# Patient Record
Sex: MALE | Race: WHITE | NOT HISPANIC OR LATINO | Employment: FULL TIME | URBAN - METROPOLITAN AREA
[De-identification: names, ages, dates, MRNs, and addresses within clinical notes are randomized per-mention and may not be internally consistent; named-entity substitution may affect disease eponyms.]

---

## 2021-03-04 ENCOUNTER — OFFICE VISIT (OUTPATIENT)
Dept: FAMILY MEDICINE | Facility: CLINIC | Age: 46
End: 2021-03-04
Payer: COMMERCIAL

## 2021-03-04 VITALS
DIASTOLIC BLOOD PRESSURE: 84 MMHG | BODY MASS INDEX: 29.9 KG/M2 | HEIGHT: 67 IN | TEMPERATURE: 98 F | OXYGEN SATURATION: 96 % | WEIGHT: 190.5 LBS | HEART RATE: 79 BPM | SYSTOLIC BLOOD PRESSURE: 120 MMHG

## 2021-03-04 DIAGNOSIS — G80.8: ICD-10-CM

## 2021-03-04 DIAGNOSIS — I10 ESSENTIAL HYPERTENSION: ICD-10-CM

## 2021-03-04 DIAGNOSIS — F41.1 ANXIETY STATE: ICD-10-CM

## 2021-03-04 DIAGNOSIS — Z76.89 ENCOUNTER TO ESTABLISH CARE: Primary | ICD-10-CM

## 2021-03-04 DIAGNOSIS — L66.1 LICHEN PLANOPILARIS: ICD-10-CM

## 2021-03-04 PROBLEM — L66.10 LICHEN PLANOPILARIS: Status: ACTIVE | Noted: 2021-03-04

## 2021-03-04 PROCEDURE — 3079F PR MOST RECENT DIASTOLIC BLOOD PRESSURE 80-89 MM HG: ICD-10-PCS | Mod: S$GLB,,, | Performed by: NURSE PRACTITIONER

## 2021-03-04 PROCEDURE — 99204 PR OFFICE/OUTPT VISIT, NEW, LEVL IV, 45-59 MIN: ICD-10-PCS | Mod: S$GLB,,, | Performed by: NURSE PRACTITIONER

## 2021-03-04 PROCEDURE — 1126F AMNT PAIN NOTED NONE PRSNT: CPT | Mod: S$GLB,,, | Performed by: NURSE PRACTITIONER

## 2021-03-04 PROCEDURE — 3008F PR BODY MASS INDEX (BMI) DOCUMENTED: ICD-10-PCS | Mod: S$GLB,,, | Performed by: NURSE PRACTITIONER

## 2021-03-04 PROCEDURE — 3008F BODY MASS INDEX DOCD: CPT | Mod: S$GLB,,, | Performed by: NURSE PRACTITIONER

## 2021-03-04 PROCEDURE — 3079F DIAST BP 80-89 MM HG: CPT | Mod: S$GLB,,, | Performed by: NURSE PRACTITIONER

## 2021-03-04 PROCEDURE — 3074F SYST BP LT 130 MM HG: CPT | Mod: S$GLB,,, | Performed by: NURSE PRACTITIONER

## 2021-03-04 PROCEDURE — 1126F PR PAIN SEVERITY QUANTIFIED, NO PAIN PRESENT: ICD-10-PCS | Mod: S$GLB,,, | Performed by: NURSE PRACTITIONER

## 2021-03-04 PROCEDURE — 3074F PR MOST RECENT SYSTOLIC BLOOD PRESSURE < 130 MM HG: ICD-10-PCS | Mod: S$GLB,,, | Performed by: NURSE PRACTITIONER

## 2021-03-04 PROCEDURE — 99204 OFFICE O/P NEW MOD 45 MIN: CPT | Mod: S$GLB,,, | Performed by: NURSE PRACTITIONER

## 2021-03-04 RX ORDER — ALPRAZOLAM 0.5 MG/1
TABLET ORAL
COMMUNITY

## 2021-03-04 RX ORDER — MINOXIDIL 2.5 MG/1
5 TABLET ORAL DAILY
COMMUNITY

## 2021-03-04 RX ORDER — LISINOPRIL 10 MG/1
10 TABLET ORAL DAILY
Qty: 90 TABLET | Refills: 1 | Status: SHIPPED | OUTPATIENT
Start: 2021-03-04 | End: 2021-09-07 | Stop reason: SDUPTHER

## 2021-03-04 RX ORDER — GLYCOPYRROLATE 2 MG/1
2 TABLET ORAL DAILY
COMMUNITY

## 2021-03-27 LAB
ALBUMIN/CREAT UR: NORMAL MCG/MG CREAT
CHOLEST SERPL-MCNC: 134 MG/DL
CHOLEST/HDLC SERPL: 2.2 (CALC)
CREAT UR-MCNC: 52 MG/DL (ref 20–320)
HDLC SERPL-MCNC: 60 MG/DL
LDLC SERPL CALC-MCNC: 62 MG/DL (CALC)
MICROALBUMIN UR-MCNC: <0.2 MG/DL
NONHDLC SERPL-MCNC: 74 MG/DL (CALC)
TRIGL SERPL-MCNC: 50 MG/DL

## 2021-03-30 ENCOUNTER — TELEPHONE (OUTPATIENT)
Dept: FAMILY MEDICINE | Facility: CLINIC | Age: 46
End: 2021-03-30

## 2021-09-07 ENCOUNTER — OFFICE VISIT (OUTPATIENT)
Dept: FAMILY MEDICINE | Facility: CLINIC | Age: 46
End: 2021-09-07
Payer: COMMERCIAL

## 2021-09-07 VITALS
OXYGEN SATURATION: 98 % | RESPIRATION RATE: 18 BRPM | SYSTOLIC BLOOD PRESSURE: 128 MMHG | HEIGHT: 67 IN | HEART RATE: 89 BPM | DIASTOLIC BLOOD PRESSURE: 84 MMHG | WEIGHT: 191 LBS | BODY MASS INDEX: 29.98 KG/M2 | TEMPERATURE: 98 F

## 2021-09-07 DIAGNOSIS — Z13.6 ENCOUNTER FOR LIPID SCREENING FOR CARDIOVASCULAR DISEASE: ICD-10-CM

## 2021-09-07 DIAGNOSIS — Z11.4 SCREENING FOR HIV (HUMAN IMMUNODEFICIENCY VIRUS): ICD-10-CM

## 2021-09-07 DIAGNOSIS — Z13.220 ENCOUNTER FOR LIPID SCREENING FOR CARDIOVASCULAR DISEASE: ICD-10-CM

## 2021-09-07 DIAGNOSIS — Z11.59 NEED FOR HEPATITIS C SCREENING TEST: ICD-10-CM

## 2021-09-07 DIAGNOSIS — I10 ESSENTIAL HYPERTENSION: Primary | ICD-10-CM

## 2021-09-07 DIAGNOSIS — Z00.00 HEALTHCARE MAINTENANCE: ICD-10-CM

## 2021-09-07 PROCEDURE — 99213 OFFICE O/P EST LOW 20 MIN: CPT | Mod: S$GLB,,, | Performed by: NURSE PRACTITIONER

## 2021-09-07 PROCEDURE — 99213 PR OFFICE/OUTPT VISIT, EST, LEVL III, 20-29 MIN: ICD-10-PCS | Mod: S$GLB,,, | Performed by: NURSE PRACTITIONER

## 2021-09-07 RX ORDER — LISINOPRIL 10 MG/1
10 TABLET ORAL DAILY
Qty: 90 TABLET | Refills: 1 | Status: SHIPPED | OUTPATIENT
Start: 2021-09-07 | End: 2022-06-01 | Stop reason: SDUPTHER

## 2022-04-21 ENCOUNTER — OFFICE VISIT (OUTPATIENT)
Dept: FAMILY MEDICINE | Facility: CLINIC | Age: 47
End: 2022-04-21
Payer: COMMERCIAL

## 2022-04-21 VITALS
SYSTOLIC BLOOD PRESSURE: 114 MMHG | WEIGHT: 187.63 LBS | TEMPERATURE: 99 F | OXYGEN SATURATION: 97 % | DIASTOLIC BLOOD PRESSURE: 80 MMHG | HEIGHT: 67 IN | HEART RATE: 80 BPM | BODY MASS INDEX: 29.45 KG/M2

## 2022-04-21 DIAGNOSIS — R06.83 LOUD SNORING: ICD-10-CM

## 2022-04-21 DIAGNOSIS — I10 ESSENTIAL HYPERTENSION: ICD-10-CM

## 2022-04-21 DIAGNOSIS — Z11.4 SCREENING FOR HIV (HUMAN IMMUNODEFICIENCY VIRUS): ICD-10-CM

## 2022-04-21 DIAGNOSIS — Z11.59 NEED FOR HEPATITIS C SCREENING TEST: ICD-10-CM

## 2022-04-21 DIAGNOSIS — F41.1 ANXIETY STATE: ICD-10-CM

## 2022-04-21 DIAGNOSIS — R06.81 WITNESSED EPISODE OF APNEA: ICD-10-CM

## 2022-04-21 DIAGNOSIS — Z00.00 ANNUAL PHYSICAL EXAM: Primary | ICD-10-CM

## 2022-04-21 PROCEDURE — 4010F ACE/ARB THERAPY RXD/TAKEN: CPT | Mod: S$GLB,,, | Performed by: NURSE PRACTITIONER

## 2022-04-21 PROCEDURE — 3079F DIAST BP 80-89 MM HG: CPT | Mod: S$GLB,,, | Performed by: NURSE PRACTITIONER

## 2022-04-21 PROCEDURE — 3008F PR BODY MASS INDEX (BMI) DOCUMENTED: ICD-10-PCS | Mod: S$GLB,,, | Performed by: NURSE PRACTITIONER

## 2022-04-21 PROCEDURE — 4010F PR ACE/ARB THEARPY RXD/TAKEN: ICD-10-PCS | Mod: S$GLB,,, | Performed by: NURSE PRACTITIONER

## 2022-04-21 PROCEDURE — 3008F BODY MASS INDEX DOCD: CPT | Mod: S$GLB,,, | Performed by: NURSE PRACTITIONER

## 2022-04-21 PROCEDURE — 3074F PR MOST RECENT SYSTOLIC BLOOD PRESSURE < 130 MM HG: ICD-10-PCS | Mod: S$GLB,,, | Performed by: NURSE PRACTITIONER

## 2022-04-21 PROCEDURE — 1160F PR REVIEW ALL MEDS BY PRESCRIBER/CLIN PHARMACIST DOCUMENTED: ICD-10-PCS | Mod: S$GLB,,, | Performed by: NURSE PRACTITIONER

## 2022-04-21 PROCEDURE — 1160F RVW MEDS BY RX/DR IN RCRD: CPT | Mod: S$GLB,,, | Performed by: NURSE PRACTITIONER

## 2022-04-21 PROCEDURE — 3074F SYST BP LT 130 MM HG: CPT | Mod: S$GLB,,, | Performed by: NURSE PRACTITIONER

## 2022-04-21 PROCEDURE — 99396 PREV VISIT EST AGE 40-64: CPT | Mod: S$GLB,,, | Performed by: NURSE PRACTITIONER

## 2022-04-21 PROCEDURE — 99396 PR PREVENTIVE VISIT,EST,40-64: ICD-10-PCS | Mod: S$GLB,,, | Performed by: NURSE PRACTITIONER

## 2022-04-21 PROCEDURE — 3079F PR MOST RECENT DIASTOLIC BLOOD PRESSURE 80-89 MM HG: ICD-10-PCS | Mod: S$GLB,,, | Performed by: NURSE PRACTITIONER

## 2022-04-21 RX ORDER — CYCLOSPORINE 100 MG/1
100 CAPSULE, LIQUID FILLED ORAL 2 TIMES DAILY
COMMUNITY
Start: 2022-02-06

## 2022-04-21 RX ORDER — KETOCONAZOLE 20 MG/ML
SHAMPOO, SUSPENSION TOPICAL
COMMUNITY
Start: 2022-01-31

## 2022-04-21 NOTE — PROGRESS NOTES
SUBJECTIVE:      Patient ID: Umair Jackson is a 46 y.o. male.    Chief Complaint: Annual Exam    46-year-old male presents to the clinic for an annual exam.  PMH significant for seizures, monoplegic cerebral palsy, hypertension, lichen planopilaris, and anxiety.  He is a nonsmoker.  Alcohol consumption is rare.  Denies illicit drug use.  Labs were ordered at his las visit, but not completed.  Overdue healthcare maintenance reviewed.  Due for tetanus and COVID-19 booster.  He has never had a colonoscopy.     Blood pressure is controlled with Lisinopril 10 mg daily.      He takes Xanax for anxiety and for his cerebral palsy.  He is followed by the neurologist Dr. Mat Silvestre.      He is on cyclosporin for lichen planopilaris and gets his liver enzymes check routinely by his dermatologist, Dr. Gita Smith. He also takes minoxidil 2.5 mg daily for alopecia and glycopyrrolate 2 mg daily for hyperhidrosis.      Concerned about sleep apnea.  Reports loud snoring.  Wife has witnessed episodes of apnea.  Denies excessive daytime fatigue or waking up gasping for air.     Hypertension  This is a chronic problem. The current episode started more than 1 year ago. The problem is controlled. Associated symptoms include anxiety. Pertinent negatives include no blurred vision, chest pain, headaches, malaise/fatigue, neck pain, orthopnea, palpitations, peripheral edema, PND, shortness of breath or sweats. There are no associated agents to hypertension. Risk factors for coronary artery disease include male gender. Past treatments include ACE inhibitors. The current treatment provides significant improvement. Compliance problems include exercise and diet.        Family History   Problem Relation Age of Onset    Hypertension Father       Social History     Socioeconomic History    Marital status:    Tobacco Use    Smoking status: Never Smoker    Smokeless tobacco: Never Used   Substance and Sexual Activity    Alcohol  use: Yes     Comment: very little    Drug use: Never    Sexual activity: Yes     Current Outpatient Medications   Medication Sig Dispense Refill    ALPRAZolam (XANAX) 0.5 MG tablet Xanax 0.5 mg tablet   Take 1 tablet 3 times a day by oral route.      cycloSPORINE modified (GENGRAF) 50 MG capsule Take 50 mg by mouth 3 (three) times daily. Patient take 3 capsules in the AM      cycloSPORINE modified, NEORAL, (NEORAL) 100 MG capsule Take 100 mg by mouth 2 (two) times daily.      glycopyrrolate (ROBINUL) 2 MG Tab Take 2 mg by mouth once daily.      lisinopriL 10 MG tablet Take 1 tablet (10 mg total) by mouth once daily. 90 tablet 1    minoxidiL (LONITEN) 2.5 MG tablet Take 5 mg by mouth once daily. 2  2.5 MG DAILY      ALPRAZolam (XANAX) 0.5 MG tablet Every day      ketoconazole (NIZORAL) 2 % shampoo SMARTSIG:Topical 2-3 Times Weekly       No current facility-administered medications for this visit.     Review of patient's allergies indicates:   Allergen Reactions    No known drug allergies       Past Medical History:   Diagnosis Date    Cerebral palsy, hemiplegic     Seizures 2002     History reviewed. No pertinent surgical history.    Review of Systems   Constitutional: Negative for activity change, appetite change, chills, diaphoresis, fatigue, fever, malaise/fatigue and unexpected weight change.   HENT: Negative for congestion, ear pain, sinus pressure, sore throat, trouble swallowing and voice change.         Lichen planopilaris of scalp    Eyes: Negative for blurred vision, pain, discharge and visual disturbance.   Respiratory: Positive for apnea. Negative for cough, chest tightness, shortness of breath and wheezing.    Cardiovascular: Negative for chest pain, palpitations, orthopnea and PND.        Hypertension    Gastrointestinal: Negative for abdominal pain, constipation, diarrhea, nausea and vomiting.   Genitourinary: Negative for difficulty urinating, flank pain, frequency and urgency.  "  Musculoskeletal: Negative for back pain, joint swelling and neck pain.   Skin: Negative for color change and rash.   Neurological: Negative for dizziness, seizures, syncope, weakness, numbness and headaches.        Monoplegic cerebral palsy, history of seizures, last seizure in 2004.   Hematological: Negative for adenopathy.   Psychiatric/Behavioral: Negative for dysphoric mood and sleep disturbance. The patient is not nervous/anxious.       OBJECTIVE:      Vitals:    04/21/22 0836   BP: 114/80   BP Location: Left arm   Patient Position: Sitting   BP Method: Medium (Manual)   Pulse: 80   Temp: 98.5 °F (36.9 °C)   TempSrc: Oral   SpO2: 97%   Weight: 85.1 kg (187 lb 9.6 oz)   Height: 5' 7" (1.702 m)     Physical Exam  Vitals and nursing note reviewed.   Constitutional:       General: He is awake. He is not in acute distress.     Appearance: Normal appearance. He is overweight. He is not ill-appearing, toxic-appearing or diaphoretic.   HENT:      Head: Normocephalic and atraumatic.      Right Ear: Tympanic membrane, ear canal and external ear normal. There is no impacted cerumen.      Left Ear: Tympanic membrane, ear canal and external ear normal. There is no impacted cerumen.      Nose: Nose normal.   Eyes:      General: Lids are normal. Gaze aligned appropriately.      Conjunctiva/sclera: Conjunctivae normal.      Right eye: Right conjunctiva is not injected.      Left eye: Left conjunctiva is not injected.      Pupils: Pupils are equal, round, and reactive to light.   Neck:      Thyroid: No thyromegaly.      Vascular: No carotid bruit.   Cardiovascular:      Rate and Rhythm: Normal rate and regular rhythm.      Pulses: Normal pulses.      Heart sounds: Normal heart sounds, S1 normal and S2 normal. No murmur heard.    No friction rub. No gallop.   Pulmonary:      Effort: Pulmonary effort is normal. No respiratory distress.      Breath sounds: Normal breath sounds. No stridor. No decreased breath sounds, wheezing, " rhonchi or rales.   Chest:      Chest wall: No tenderness.   Abdominal:      General: Bowel sounds are normal.      Palpations: Abdomen is soft.      Tenderness: There is no abdominal tenderness.   Musculoskeletal:      Right hand: Deformity present.      Cervical back: Neck supple.      Right lower leg: No edema.      Left lower leg: No edema.      Comments: Right hand contracture and deformity due to CP.   Lymphadenopathy:      Cervical: No cervical adenopathy.   Skin:     General: Skin is warm and dry.      Capillary Refill: Capillary refill takes less than 2 seconds.      Findings: No erythema or rash.      Comments: Scalp alopecia   Neurological:      Mental Status: He is alert and oriented to person, place, and time. Mental status is at baseline.      Cranial Nerves: Cranial nerves are intact.      Comments: Right sided weakness    Psychiatric:         Attention and Perception: Attention normal.         Mood and Affect: Mood normal.         Speech: Speech normal.         Behavior: Behavior normal. Behavior is cooperative.         Thought Content: Thought content normal.         Judgment: Judgment normal.        Assessment:       1. Annual physical exam    2. Essential hypertension    3. Witnessed episode of apnea    4. Loud snoring    5. Anxiety state    6. Screening for HIV (human immunodeficiency virus)    7. Need for hepatitis C screening test        Plan:       Annual physical exam  Counseled on age and gender appropriate medical preventative services, including cancer screenings, immunizations, overall nutritional health, need for a consistent exercise regimen and an overall push towards maintaining a vigorous and active lifestyle.  Discussed colon cancer screening and various options.  Patient will think about it and will discuss again in 6 months at follow-up. COVID-19 booster recommended.   -     Comprehensive Metabolic Panel; Future; Expected date: 04/21/2022  -     Lipid Panel; Future; Expected date:  04/21/2022  -     Hepatitis C Antibody; Future; Expected date: 04/21/2022  -     TSH; Future; Expected date: 04/21/2022  -     Microalbumin/Creatinine Ratio, Urine; Future; Expected date: 04/21/2022  -     Urinalysis; Future; Expected date: 04/21/2022  -     HIV 1/2 Ag/Ab (4th Gen); Future; Expected date: 04/21/2022    Essential hypertension  Stable, continue Lisinopril.  Low sodium diet, exercise, and weight loss encouraged.   -     Comprehensive Metabolic Panel; Future; Expected date: 04/21/2022  -     Lipid Panel; Future; Expected date: 04/21/2022  -     TSH; Future; Expected date: 04/21/2022  -     Microalbumin/Creatinine Ratio, Urine; Future; Expected date: 04/21/2022  -     Urinalysis; Future; Expected date: 04/21/2022    Witnessed episode of apnea  Sleep study ordered and faxed to [facility].   -     Home Sleep Studies; Future    Loud snoring  -     Home Sleep Studies; Future    Anxiety state  Stable with Xanax, managed by Neurology.   -     TSH; Future; Expected date: 04/21/2022    Screening for HIV (human immunodeficiency virus)  -     HIV 1/2 Ag/Ab (4th Gen); Future; Expected date: 04/21/2022    Need for hepatitis C screening test  -     Hepatitis C Antibody; Future; Expected date: 04/21/2022    This note was created using [employer] voice recognition software that occasionally misinterprets phrases or words.     Follow up in about 6 months (around 10/21/2022) for HTN.      4/21/2022 GOLDEN Callejas, FNP

## 2022-06-01 DIAGNOSIS — I10 ESSENTIAL HYPERTENSION: ICD-10-CM

## 2022-06-01 RX ORDER — LISINOPRIL 10 MG/1
10 TABLET ORAL DAILY
Qty: 30 TABLET | Refills: 0 | Status: SHIPPED | OUTPATIENT
Start: 2022-06-01 | End: 2022-07-06 | Stop reason: SDUPTHER

## 2022-07-06 DIAGNOSIS — I10 ESSENTIAL HYPERTENSION: ICD-10-CM

## 2022-07-06 RX ORDER — LISINOPRIL 10 MG/1
10 TABLET ORAL DAILY
Qty: 30 TABLET | Refills: 2 | Status: SHIPPED | OUTPATIENT
Start: 2022-07-06 | End: 2022-10-10

## 2022-07-06 RX ORDER — LISINOPRIL 10 MG/1
TABLET ORAL
Qty: 30 TABLET | Refills: 0 | OUTPATIENT
Start: 2022-07-06

## 2022-07-06 NOTE — TELEPHONE ENCOUNTER
----- Message from Anabel Segovia sent at 7/6/2022 12:55 PM CDT -----  Patient requests Lisinopril refill sent to [facility] .

## 2022-10-27 ENCOUNTER — OFFICE VISIT (OUTPATIENT)
Dept: FAMILY MEDICINE | Facility: CLINIC | Age: 47
End: 2022-10-27
Payer: COMMERCIAL

## 2022-10-27 VITALS
DIASTOLIC BLOOD PRESSURE: 82 MMHG | OXYGEN SATURATION: 96 % | SYSTOLIC BLOOD PRESSURE: 120 MMHG | TEMPERATURE: 98 F | HEART RATE: 91 BPM | WEIGHT: 193.31 LBS | HEIGHT: 67 IN | BODY MASS INDEX: 30.34 KG/M2

## 2022-10-27 DIAGNOSIS — F41.1 ANXIETY STATE: ICD-10-CM

## 2022-10-27 DIAGNOSIS — Z11.4 SCREENING FOR HIV (HUMAN IMMUNODEFICIENCY VIRUS): ICD-10-CM

## 2022-10-27 DIAGNOSIS — L66.1 LICHEN PLANOPILARIS: ICD-10-CM

## 2022-10-27 DIAGNOSIS — I10 ESSENTIAL HYPERTENSION: Primary | ICD-10-CM

## 2022-10-27 DIAGNOSIS — G40.309 GENERALIZED CONVULSIVE EPILEPSY: ICD-10-CM

## 2022-10-27 DIAGNOSIS — Z11.59 NEED FOR HEPATITIS C SCREENING TEST: ICD-10-CM

## 2022-10-27 DIAGNOSIS — G80.1 SPASTIC CEREBRAL PALSY: ICD-10-CM

## 2022-10-27 PROCEDURE — 99214 PR OFFICE/OUTPT VISIT, EST, LEVL IV, 30-39 MIN: ICD-10-PCS | Mod: S$GLB,,, | Performed by: NURSE PRACTITIONER

## 2022-10-27 PROCEDURE — 1160F RVW MEDS BY RX/DR IN RCRD: CPT | Mod: CPTII,S$GLB,, | Performed by: NURSE PRACTITIONER

## 2022-10-27 PROCEDURE — 99214 OFFICE O/P EST MOD 30 MIN: CPT | Mod: S$GLB,,, | Performed by: NURSE PRACTITIONER

## 2022-10-27 PROCEDURE — 3079F DIAST BP 80-89 MM HG: CPT | Mod: CPTII,S$GLB,, | Performed by: NURSE PRACTITIONER

## 2022-10-27 PROCEDURE — 3079F PR MOST RECENT DIASTOLIC BLOOD PRESSURE 80-89 MM HG: ICD-10-PCS | Mod: CPTII,S$GLB,, | Performed by: NURSE PRACTITIONER

## 2022-10-27 PROCEDURE — 1159F MED LIST DOCD IN RCRD: CPT | Mod: CPTII,S$GLB,, | Performed by: NURSE PRACTITIONER

## 2022-10-27 PROCEDURE — 1160F PR REVIEW ALL MEDS BY PRESCRIBER/CLIN PHARMACIST DOCUMENTED: ICD-10-PCS | Mod: CPTII,S$GLB,, | Performed by: NURSE PRACTITIONER

## 2022-10-27 PROCEDURE — 4010F ACE/ARB THERAPY RXD/TAKEN: CPT | Mod: CPTII,S$GLB,, | Performed by: NURSE PRACTITIONER

## 2022-10-27 PROCEDURE — 3074F SYST BP LT 130 MM HG: CPT | Mod: CPTII,S$GLB,, | Performed by: NURSE PRACTITIONER

## 2022-10-27 PROCEDURE — 3074F PR MOST RECENT SYSTOLIC BLOOD PRESSURE < 130 MM HG: ICD-10-PCS | Mod: CPTII,S$GLB,, | Performed by: NURSE PRACTITIONER

## 2022-10-27 PROCEDURE — 1159F PR MEDICATION LIST DOCUMENTED IN MEDICAL RECORD: ICD-10-PCS | Mod: CPTII,S$GLB,, | Performed by: NURSE PRACTITIONER

## 2022-10-27 PROCEDURE — 4010F PR ACE/ARB THEARPY RXD/TAKEN: ICD-10-PCS | Mod: CPTII,S$GLB,, | Performed by: NURSE PRACTITIONER

## 2022-10-27 RX ORDER — LISINOPRIL 10 MG/1
10 TABLET ORAL DAILY
Qty: 90 TABLET | Refills: 1 | Status: SHIPPED | OUTPATIENT
Start: 2022-10-27 | End: 2023-04-27 | Stop reason: SDUPTHER

## 2022-10-27 NOTE — PROGRESS NOTES
SUBJECTIVE:      Patient ID: Umair Jackson is a 47 y.o. male.    Chief Complaint: Follow-up, Hypertension, and Medication Refill (Lisinopril )    47-year-old male presents to clinic for hypertension follow-up.      Labs were ordered, but not completed.  He takes lisinopril 10 mg once daily.  Blood pressure has been controlled.  Patient is doing well offers no new complaints.  He needs a medication refill.    Patient had his colonoscopy on 10/13, no polyps or biopsies, cleared for 10 years.    He takes Xanax for anxiety and for his cerebral palsy, which is managed by Dr. Mat Silvestre his neurologist.      He is on cyclosporin for lichen planopilaris and gets his liver enzymes check routinely by his dermatologist, Dr. Gita Smith.     Hypertension  This is a chronic problem. The current episode started more than 1 year ago. The problem is controlled. Associated symptoms include anxiety. Pertinent negatives include no blurred vision, chest pain, headaches, malaise/fatigue, neck pain, orthopnea, palpitations, peripheral edema, PND, shortness of breath or sweats. There are no associated agents to hypertension. Risk factors for coronary artery disease include male gender. Past treatments include ACE inhibitors. The current treatment provides significant improvement. Compliance problems include exercise and diet.  There is no history of angina. There is no history of chronic renal disease or sleep apnea.     Family History   Problem Relation Age of Onset    Hypertension Father       Social History     Socioeconomic History    Marital status:    Tobacco Use    Smoking status: Never    Smokeless tobacco: Never   Substance and Sexual Activity    Alcohol use: Yes     Comment: very little    Drug use: Never    Sexual activity: Yes     Current Outpatient Medications   Medication Sig Dispense Refill    ALPRAZolam (XANAX) 0.5 MG tablet Xanax 0.5 mg tablet   Take 1 tablet 3 times a day by oral route.      cycloSPORINE  modified, NEORAL, (NEORAL) 100 MG capsule Take 100 mg by mouth 2 (two) times daily.      glycopyrrolate (ROBINUL) 2 MG Tab Take 2 mg by mouth once daily.      ketoconazole (NIZORAL) 2 % shampoo SMARTSIG:Topical 2-3 Times Weekly      minoxidiL (LONITEN) 2.5 MG tablet Take 5 mg by mouth once daily. 2  2.5 MG DAILY      lisinopriL 10 MG tablet Take 1 tablet (10 mg total) by mouth once daily. 90 tablet 1     No current facility-administered medications for this visit.     Review of patient's allergies indicates:   Allergen Reactions    No known drug allergies       Past Medical History:   Diagnosis Date    Cerebral palsy, hemiplegic     Seizures 2002     No past surgical history on file.    Review of Systems   Constitutional:  Negative for activity change, appetite change, chills, diaphoresis, fatigue, fever, malaise/fatigue and unexpected weight change.   HENT:  Negative for congestion, ear pain, hearing loss, rhinorrhea, sinus pressure, sore throat, trouble swallowing and voice change.    Eyes:  Negative for blurred vision, pain, discharge and visual disturbance.   Respiratory:  Negative for cough, chest tightness, shortness of breath and wheezing.    Cardiovascular:  Negative for chest pain, palpitations, orthopnea and PND.   Gastrointestinal:  Negative for abdominal pain, blood in stool, constipation, diarrhea, nausea and vomiting.   Endocrine: Negative for polydipsia and polyuria.   Genitourinary:  Negative for difficulty urinating, flank pain, frequency, hematuria and urgency.   Musculoskeletal:  Negative for arthralgias, back pain, joint swelling and neck pain.   Skin:  Negative for color change and rash.   Neurological:  Negative for dizziness, seizures, syncope, weakness, numbness and headaches.   Hematological:  Negative for adenopathy.   Psychiatric/Behavioral:  Negative for confusion, dysphoric mood and sleep disturbance. The patient is not nervous/anxious.     OBJECTIVE:      Vitals:    10/27/22 0829  "10/27/22 0847   BP: (!) 138/92 120/82   BP Location: Left arm    Patient Position: Sitting    BP Method: Medium (Manual)    Pulse: 91    Temp: 98.1 °F (36.7 °C)    TempSrc: Oral    SpO2: 96%    Weight: 87.7 kg (193 lb 4.8 oz)    Height: 5' 7" (1.702 m)      Physical Exam  Vitals and nursing note reviewed.   Constitutional:       General: He is awake. He is not in acute distress.     Appearance: Normal appearance. He is overweight. He is not ill-appearing, toxic-appearing or diaphoretic.   HENT:      Head: Normocephalic and atraumatic.      Right Ear: Tympanic membrane, ear canal and external ear normal. There is no impacted cerumen.      Left Ear: Tympanic membrane, ear canal and external ear normal. There is no impacted cerumen.      Nose: Nose normal.   Eyes:      General: Lids are normal. Gaze aligned appropriately.      Conjunctiva/sclera: Conjunctivae normal.      Right eye: Right conjunctiva is not injected.      Left eye: Left conjunctiva is not injected.      Pupils: Pupils are equal, round, and reactive to light.   Neck:      Thyroid: No thyromegaly.      Vascular: No carotid bruit.   Cardiovascular:      Rate and Rhythm: Normal rate and regular rhythm.      Pulses: Normal pulses.      Heart sounds: Normal heart sounds, S1 normal and S2 normal. No murmur heard.    No friction rub. No gallop.   Pulmonary:      Effort: Pulmonary effort is normal. No respiratory distress.      Breath sounds: Normal breath sounds. No stridor. No decreased breath sounds, wheezing, rhonchi or rales.   Chest:      Chest wall: No tenderness.   Abdominal:      General: Bowel sounds are normal.      Palpations: Abdomen is soft.      Tenderness: There is no abdominal tenderness.   Musculoskeletal:      Right hand: Deformity present.      Cervical back: Neck supple.      Right lower leg: No edema.      Left lower leg: No edema.      Comments: Right hand contracture and deformity due to CP.   Lymphadenopathy:      Cervical: No cervical " adenopathy.   Skin:     General: Skin is warm and dry.      Capillary Refill: Capillary refill takes less than 2 seconds.      Findings: No erythema or rash.      Comments: Scalp alopecia   Neurological:      Mental Status: He is alert and oriented to person, place, and time. Mental status is at baseline.      Comments: Right sided weakness    Psychiatric:         Attention and Perception: Attention normal.         Mood and Affect: Mood normal.         Speech: Speech normal.         Behavior: Behavior normal. Behavior is cooperative.         Thought Content: Thought content normal.         Judgment: Judgment normal.      Assessment:       1. Essential hypertension    2. Anxiety state    3. Spastic cerebral palsy    4. Generalized convulsive epilepsy    5. Lichen planopilaris    6. Screening for HIV (human immunodeficiency virus)    7. Need for hepatitis C screening test        Plan:       Essential hypertension  Stable, continue Lisinopril 10 mg, low sodium diet.  Continue exercising at the gym.   -     Comprehensive Metabolic Panel; Future; Expected date: 10/27/2022  -     Lipid Panel; Future; Expected date: 10/27/2022  -     TSH; Future; Expected date: 10/27/2022  -     Microalbumin/Creatinine Ratio, Urine; Future; Expected date: 10/27/2022  -     Urinalysis; Future; Expected date: 10/27/2022  -     lisinopriL 10 MG tablet; Take 1 tablet (10 mg total) by mouth once daily.  Dispense: 90 tablet; Refill: 1    Anxiety state  Stable with Xanax, managed by Neurology.   -     TSH; Future; Expected date: 10/27/2022    Spastic cerebral palsy  Managed by Neurology.     Generalized convulsive epilepsy  Stable, no recent seizure.  No longer on seizure medication.  Managed by Neurology.   -     Comprehensive Metabolic Panel; Future; Expected date: 10/27/2022  -     TSH; Future; Expected date: 10/27/2022    Lichen planopilaris  Managed by Dermatology.     Screening for HIV (human immunodeficiency virus)  -     HIV 1/2 Ag/Ab  (4th Gen); Future; Expected date: 10/27/2022    Need for hepatitis C screening test  -     Hepatitis C Antibody; Future; Expected date: 10/27/2022    This note was created using [employer] voice recognition software that occasionally misinterprets phrases or words.     Follow up in about 6 months (around 4/27/2023) for Annual Physical.      10/27/2022 GOLDEN Callejas, FNP

## 2023-03-02 ENCOUNTER — TELEPHONE (OUTPATIENT)
Dept: FAMILY MEDICINE | Facility: CLINIC | Age: 48
End: 2023-03-02

## 2023-03-02 DIAGNOSIS — I10 ESSENTIAL HYPERTENSION: ICD-10-CM

## 2023-03-02 RX ORDER — LISINOPRIL 10 MG/1
10 TABLET ORAL DAILY
Qty: 90 TABLET | Refills: 1 | OUTPATIENT
Start: 2023-03-02

## 2023-04-27 ENCOUNTER — OFFICE VISIT (OUTPATIENT)
Dept: FAMILY MEDICINE | Facility: CLINIC | Age: 48
End: 2023-04-27
Payer: COMMERCIAL

## 2023-04-27 VITALS
WEIGHT: 190 LBS | HEIGHT: 67 IN | TEMPERATURE: 99 F | BODY MASS INDEX: 29.82 KG/M2 | HEART RATE: 98 BPM | SYSTOLIC BLOOD PRESSURE: 126 MMHG | DIASTOLIC BLOOD PRESSURE: 82 MMHG | OXYGEN SATURATION: 97 %

## 2023-04-27 DIAGNOSIS — G80.1 SPASTIC CEREBRAL PALSY: ICD-10-CM

## 2023-04-27 DIAGNOSIS — Z11.59 NEED FOR HEPATITIS C SCREENING TEST: ICD-10-CM

## 2023-04-27 DIAGNOSIS — G40.309 GENERALIZED CONVULSIVE EPILEPSY: ICD-10-CM

## 2023-04-27 DIAGNOSIS — I10 ESSENTIAL HYPERTENSION: Primary | ICD-10-CM

## 2023-04-27 DIAGNOSIS — Z11.4 SCREENING FOR HIV (HUMAN IMMUNODEFICIENCY VIRUS): ICD-10-CM

## 2023-04-27 DIAGNOSIS — Z13.1 SCREENING FOR DIABETES MELLITUS: ICD-10-CM

## 2023-04-27 DIAGNOSIS — F41.1 ANXIETY STATE: ICD-10-CM

## 2023-04-27 PROCEDURE — 99213 PR OFFICE/OUTPT VISIT, EST, LEVL III, 20-29 MIN: ICD-10-PCS | Mod: S$GLB,,, | Performed by: NURSE PRACTITIONER

## 2023-04-27 PROCEDURE — 1159F MED LIST DOCD IN RCRD: CPT | Mod: CPTII,S$GLB,, | Performed by: NURSE PRACTITIONER

## 2023-04-27 PROCEDURE — 1159F PR MEDICATION LIST DOCUMENTED IN MEDICAL RECORD: ICD-10-PCS | Mod: CPTII,S$GLB,, | Performed by: NURSE PRACTITIONER

## 2023-04-27 PROCEDURE — 3079F DIAST BP 80-89 MM HG: CPT | Mod: CPTII,S$GLB,, | Performed by: NURSE PRACTITIONER

## 2023-04-27 PROCEDURE — 99213 OFFICE O/P EST LOW 20 MIN: CPT | Mod: S$GLB,,, | Performed by: NURSE PRACTITIONER

## 2023-04-27 PROCEDURE — 3008F PR BODY MASS INDEX (BMI) DOCUMENTED: ICD-10-PCS | Mod: CPTII,S$GLB,, | Performed by: NURSE PRACTITIONER

## 2023-04-27 PROCEDURE — 1160F RVW MEDS BY RX/DR IN RCRD: CPT | Mod: CPTII,S$GLB,, | Performed by: NURSE PRACTITIONER

## 2023-04-27 PROCEDURE — 3008F BODY MASS INDEX DOCD: CPT | Mod: CPTII,S$GLB,, | Performed by: NURSE PRACTITIONER

## 2023-04-27 PROCEDURE — 1160F PR REVIEW ALL MEDS BY PRESCRIBER/CLIN PHARMACIST DOCUMENTED: ICD-10-PCS | Mod: CPTII,S$GLB,, | Performed by: NURSE PRACTITIONER

## 2023-04-27 PROCEDURE — 3079F PR MOST RECENT DIASTOLIC BLOOD PRESSURE 80-89 MM HG: ICD-10-PCS | Mod: CPTII,S$GLB,, | Performed by: NURSE PRACTITIONER

## 2023-04-27 PROCEDURE — 3074F PR MOST RECENT SYSTOLIC BLOOD PRESSURE < 130 MM HG: ICD-10-PCS | Mod: CPTII,S$GLB,, | Performed by: NURSE PRACTITIONER

## 2023-04-27 PROCEDURE — 4010F ACE/ARB THERAPY RXD/TAKEN: CPT | Mod: CPTII,S$GLB,, | Performed by: NURSE PRACTITIONER

## 2023-04-27 PROCEDURE — 3074F SYST BP LT 130 MM HG: CPT | Mod: CPTII,S$GLB,, | Performed by: NURSE PRACTITIONER

## 2023-04-27 PROCEDURE — 4010F PR ACE/ARB THEARPY RXD/TAKEN: ICD-10-PCS | Mod: CPTII,S$GLB,, | Performed by: NURSE PRACTITIONER

## 2023-04-27 RX ORDER — LISINOPRIL 10 MG/1
10 TABLET ORAL DAILY
Qty: 90 TABLET | Refills: 1 | Status: SHIPPED | OUTPATIENT
Start: 2023-04-27 | End: 2023-11-10 | Stop reason: SDUPTHER

## 2023-04-27 NOTE — PROGRESS NOTES
SUBJECTIVE:      Patient ID: Umair Jackson is a 47 y.o. male.    Chief Complaint: Follow-up and Hypertension    47-year-old male presents to clinic for hypertension follow-up.      Labs were ordered, but not completed.  He takes lisinopril 10 mg once daily.  Blood pressure elevated today.  Reports having a stressful morning. Patient is doing well offers no new complaints.  He needs a medication refill.    He takes Xanax for anxiety and for his cerebral palsy, which is managed by Dr. Mat Silvestre his neurologist.      He is on cyclosporin for lichen planopilaris and gets his liver enzymes check routinely by his dermatologist, Dr. Gita Smith.     Hypertension  This is a chronic problem. The current episode started more than 1 year ago. The problem is controlled. Associated symptoms include anxiety. Pertinent negatives include no blurred vision, chest pain, headaches, malaise/fatigue, neck pain, orthopnea, palpitations, peripheral edema, PND, shortness of breath or sweats. There are no associated agents to hypertension. Risk factors for coronary artery disease include male gender. Past treatments include ACE inhibitors. The current treatment provides significant improvement. Compliance problems include exercise and diet.  There is no history of angina. There is no history of chronic renal disease or sleep apnea.     Family History   Problem Relation Age of Onset    Hypertension Father       Social History     Socioeconomic History    Marital status:    Tobacco Use    Smoking status: Never    Smokeless tobacco: Never   Substance and Sexual Activity    Alcohol use: Yes     Comment: very little    Drug use: Never    Sexual activity: Yes     Current Outpatient Medications   Medication Sig Dispense Refill    ALPRAZolam (XANAX) 0.5 MG tablet Xanax 0.5 mg tablet   Take 1 tablet 3 times a day by oral route.      cycloSPORINE modified, NEORAL, (NEORAL) 100 MG capsule Take 100 mg by mouth 2 (two) times daily.       glycopyrrolate (ROBINUL) 2 MG Tab Take 2 mg by mouth once daily.      ketoconazole (NIZORAL) 2 % shampoo SMARTSIG:Topical 2-3 Times Weekly      minoxidiL (LONITEN) 2.5 MG tablet Take 5 mg by mouth once daily. 2  2.5 MG DAILY      tazarotene (TAZORAC) 0.1 % cream Apply pea sized amount nightly as tolerated. 60 g 5    lisinopriL 10 MG tablet Take 1 tablet (10 mg total) by mouth once daily. 90 tablet 1     No current facility-administered medications for this visit.     Review of patient's allergies indicates:   Allergen Reactions    No known drug allergies       Past Medical History:   Diagnosis Date    Cerebral palsy, hemiplegic     Seizures 2002     History reviewed. No pertinent surgical history.    Review of Systems   Constitutional:  Negative for activity change, appetite change, chills, diaphoresis, fatigue, fever, malaise/fatigue and unexpected weight change.   HENT:  Negative for congestion, ear pain, hearing loss, rhinorrhea, sinus pressure, sore throat, trouble swallowing and voice change.    Eyes:  Negative for blurred vision, pain, discharge and visual disturbance.   Respiratory:  Negative for cough, chest tightness, shortness of breath and wheezing.    Cardiovascular:  Negative for chest pain, palpitations, orthopnea and PND.   Gastrointestinal:  Negative for abdominal pain, blood in stool, constipation, diarrhea, nausea and vomiting.   Endocrine: Negative for polydipsia and polyuria.   Genitourinary:  Negative for difficulty urinating, flank pain, frequency, hematuria and urgency.   Musculoskeletal:  Negative for arthralgias, back pain, joint swelling and neck pain.   Skin:  Negative for color change and rash.   Neurological:  Negative for dizziness, seizures, syncope, weakness, numbness and headaches.   Hematological:  Negative for adenopathy.   Psychiatric/Behavioral:  Negative for confusion, dysphoric mood and sleep disturbance. The patient is not nervous/anxious.     OBJECTIVE:      Vitals:     "04/27/23 0956 04/27/23 1005   BP: (!) 154/100 126/82   BP Location: Left arm    Patient Position: Sitting    BP Method: Medium (Automatic)    Pulse: 98    Temp: 98.6 °F (37 °C)    TempSrc: Oral    SpO2: 97%    Weight: 86.2 kg (190 lb)    Height: 5' 7" (1.702 m)      Physical Exam  Vitals and nursing note reviewed.   Constitutional:       General: He is awake. He is not in acute distress.     Appearance: Normal appearance. He is overweight. He is not ill-appearing, toxic-appearing or diaphoretic.   HENT:      Head: Normocephalic and atraumatic.      Right Ear: Tympanic membrane, ear canal and external ear normal. There is no impacted cerumen.      Left Ear: Tympanic membrane, ear canal and external ear normal. There is no impacted cerumen.      Nose: Nose normal.   Eyes:      General: Lids are normal. Gaze aligned appropriately.      Conjunctiva/sclera: Conjunctivae normal.      Right eye: Right conjunctiva is not injected.      Left eye: Left conjunctiva is not injected.      Pupils: Pupils are equal, round, and reactive to light.   Neck:      Thyroid: No thyromegaly.      Vascular: No carotid bruit.   Cardiovascular:      Rate and Rhythm: Normal rate and regular rhythm.      Pulses: Normal pulses.      Heart sounds: Normal heart sounds, S1 normal and S2 normal. No murmur heard.    No friction rub. No gallop.   Pulmonary:      Effort: Pulmonary effort is normal. No respiratory distress.      Breath sounds: Normal breath sounds. No stridor. No decreased breath sounds, wheezing, rhonchi or rales.   Chest:      Chest wall: No tenderness.   Abdominal:      General: Bowel sounds are normal.      Palpations: Abdomen is soft.      Tenderness: There is no abdominal tenderness.   Musculoskeletal:      Right hand: Deformity present.      Cervical back: Neck supple.      Right lower leg: No edema.      Left lower leg: No edema.      Comments: Right hand contracture and deformity due to CP.   Lymphadenopathy:      Cervical: No " cervical adenopathy.   Skin:     General: Skin is warm and dry.      Capillary Refill: Capillary refill takes less than 2 seconds.      Findings: No erythema or rash.      Comments: Scalp alopecia   Neurological:      Mental Status: He is alert and oriented to person, place, and time. Mental status is at baseline.      Comments: Right sided weakness    Psychiatric:         Attention and Perception: Attention normal.         Mood and Affect: Mood normal.         Speech: Speech normal.         Behavior: Behavior normal. Behavior is cooperative.         Thought Content: Thought content normal.         Judgment: Judgment normal.      Assessment:       1. Essential hypertension    2. Anxiety state    3. Spastic cerebral palsy    4. Generalized convulsive epilepsy    5. Screening for diabetes mellitus    6. Screening for HIV (human immunodeficiency virus)    7. Need for hepatitis C screening test        Plan:       Essential hypertension  Stable with Lisinopril 10 mg. Encouraged patient to get labs completed ASAP. Reduce the amount of salt in your diet; Lose weight; Avoid drinking too much alcohol; Exercise at least 30 minutes per day most days of the week.  Continue current medications and home BP monitoring.  -     Comprehensive Metabolic Panel; Future; Expected date: 04/27/2023  -     Lipid Panel; Future; Expected date: 04/27/2023  -     TSH; Future; Expected date: 04/27/2023  -     Microalbumin/Creatinine Ratio, Urine; Future; Expected date: 04/27/2023  -     Urinalysis; Future; Expected date: 04/27/2023  -     lisinopriL 10 MG tablet; Take 1 tablet (10 mg total) by mouth once daily.  Dispense: 90 tablet; Refill: 1    Anxiety state  Stable with Xanax prn, managed by Neurology.   -     TSH; Future; Expected date: 04/27/2023    Spastic cerebral palsy  Stable, managed by Neurology.     Generalized convulsive epilepsy  Stable, no recent seizure, no longer on seizure medications, managed by Neurology.     Screening for  diabetes mellitus  -     Hemoglobin A1C; Future; Expected date: 04/27/2023    Screening for HIV (human immunodeficiency virus)  -     HIV 1/2 Ag/Ab (4th Gen); Future; Expected date: 04/27/2023    Need for hepatitis C screening test  -     Hepatitis C Antibody; Future; Expected date: 04/27/2023    This note was created using [employer] voice recognition software that occasionally misinterprets phrases or words.     I spent a total of 17 minutes on the day of the visit.This includes face to face time and non-face to face time preparing to see the patient (eg, review of tests), obtaining and/or reviewing separately obtained history, documenting clinical information in the electronic or other health record, independently interpreting results and communicating results to the patient/family/caregiver, or care coordinator.    Follow up in about 6 months (around 10/27/2023) for HTN.      4/27/2023 GOLDEN Callejas, FNP

## 2023-11-10 DIAGNOSIS — I10 ESSENTIAL HYPERTENSION: ICD-10-CM

## 2023-11-12 RX ORDER — LISINOPRIL 10 MG/1
10 TABLET ORAL DAILY
Qty: 90 TABLET | Refills: 0 | Status: SHIPPED | OUTPATIENT
Start: 2023-11-12 | End: 2024-02-23 | Stop reason: SDUPTHER

## 2024-02-23 DIAGNOSIS — I10 ESSENTIAL HYPERTENSION: ICD-10-CM

## 2024-02-23 RX ORDER — LISINOPRIL 10 MG/1
10 TABLET ORAL DAILY
Qty: 30 TABLET | Refills: 0 | Status: SHIPPED | OUTPATIENT
Start: 2024-02-23 | End: 2024-02-26 | Stop reason: SDUPTHER

## 2024-02-24 NOTE — PROGRESS NOTES
Subjective:       Patient ID: Umair Jackson is a 48 y.o. male.    Chief Complaint: Medication Refill, Hypertension, Sleep Apnea, Scarring alopecia, Anxiety, and Long-term effects of cerebral palsy    This is a follow-up for Mr. Jonathan Jackson who is a 48-year-old gentleman.  He is here accompanied with his wife who tends to keep an eye on his medical affairs.  He usually follows up Rebeka Harpal Angela, [facility] but is here to get his medications refilled on an urgent basis he has run out of them.    Issues are as below:-    1.-hypertension since several years currently on lisinopril 10 mg  2.-history of seizure disorder  3.-mono plegic cerebral palsy-following up with Dr. Silvestre  4.-lichen planopilaris  5.-anxiety and body stiffening provoked by certain situations for which he may take an occasional Xanax.-Dr. Silvestre.  6.-another issue which has not been documented thus far is that he was indeed diagnosed with sleep apnea based on a home study but when it came time to get a CPAP device he backed off.  His wife is concerned about his snoring though there has been no witnessed apneic episode or significant gasping.  She would like this to be reopened and get an expert opinion before there is a compulsion for him to use a mask.  7.-scarring alopecia currently on minoxidil, cyclosporin and Tazorac.  Follows up with dermatologist.    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  Given his weakness on the right side, he is extra cautious about climbing on roofs and has somebody either monitoring him or keeping a close eye on him.  Thus far no falls.      He also has diffuse alopecia for which he has seen dermatologist and was prescribed minoxidil 0.5 mg.  He also takes Tazorac.  Also cyclosporin    He gets episodes of sweating and cause is not clear but he takes Robinul for the same.        PMH significant for seizures, monoplegic cerebral palsy, hypertension, lichen planopilaris, and anxiety.  He is a nonsmoker.  Alcohol  consumption is rare.  Denies illicit drug use.  Labs were ordered at his las visit, but not completed.  Overdue healthcare maintenance reviewed.  Due for tetanus and COVID-19 booster.  He has never had a colonoscopy.          Hypertension  This is a chronic problem. The current episode started more than 1 year ago. The problem is unchanged. The problem is controlled. Associated symptoms include anxiety. Pertinent negatives include no chest pain, headaches, malaise/fatigue, neck pain, palpitations or shortness of breath. Past treatments include ACE inhibitors. There is no history of angina, kidney disease or PVD. Identifiable causes of hypertension include sleep apnea (strongly suspect). There is no history of chronic renal disease, coarctation of the aorta, hyperaldosteronism, hypercortisolism, a hypertension causing med or a thyroid problem.   Other  This is a chronic (Sleep Apnea) problem. The current episode started more than 1 year ago. The problem occurs constantly. The problem has been waxing and waning. Associated symptoms include weakness. Pertinent negatives include no abdominal pain, arthralgias, chest pain, chills, coughing, diaphoresis, fatigue, fever, headaches, joint swelling, nausea, neck pain, numbness, rash or vomiting. Associated symptoms comments: SNORING- NO GASPING. Treatments tried: OFFERED CPAP- DID NOT ACCEPT.       Past Medical History:   Diagnosis Date    Cerebral palsy, hemiplegic     Seizures 01/01/2002     Social History     Socioeconomic History    Marital status:    Tobacco Use    Smoking status: Never    Smokeless tobacco: Never   Substance and Sexual Activity    Alcohol use: Yes     Comment: very little    Drug use: Never    Sexual activity: Yes     History reviewed. No pertinent surgical history.  Family History   Problem Relation Age of Onset    Hypertension Father        Review of Systems   Constitutional:  Negative for activity change, appetite change, chills, diaphoresis,  "fatigue, fever, malaise/fatigue and unexpected weight change.   HENT:  Negative for hearing loss and rhinorrhea.    Eyes:  Negative for pain, discharge and visual disturbance.   Respiratory:  Positive for apnea. Negative for cough, chest tightness, shortness of breath and wheezing.    Cardiovascular:  Negative for chest pain and palpitations.        Htn   Gastrointestinal:  Negative for abdominal pain, blood in stool, constipation, diarrhea, nausea and vomiting.   Endocrine: Negative for polydipsia and polyuria.   Genitourinary:  Negative for difficulty urinating, flank pain, frequency, hematuria and urgency.   Musculoskeletal:  Negative for arthralgias, back pain, joint swelling and neck pain.   Skin:  Negative for color change and rash.        Minoxidil Is for hair loss.  Not for blood pressure.  Scarring alopecia.    He also gets cyclosporin for controlling his immune system.   Neurological:  Positive for weakness. Negative for dizziness, seizures, syncope, numbness and headaches.        So history of cerebral palsy with residual weakness on the right side.   Hematological:  Negative for adenopathy.   Psychiatric/Behavioral:  Negative for confusion, dysphoric mood and sleep disturbance. The patient is not nervous/anxious.          Objective:      Blood pressure 126/79, pulse 87, resp. rate 18, height 5' 7" (1.702 m), weight 88 kg (194 lb), SpO2 98 %. Body mass index is 30.38 kg/m².  Physical Exam  Vitals and nursing note reviewed.   Constitutional:       General: He is awake. He is not in acute distress.     Appearance: Normal appearance. He is overweight. He is not ill-appearing, toxic-appearing or diaphoretic.      Comments: BMI 30.38   HENT:      Head: Normocephalic and atraumatic.      Right Ear: There is no impacted cerumen.      Left Ear: There is no impacted cerumen.   Eyes:      General: Lids are normal. Gaze aligned appropriately.      Conjunctiva/sclera: Conjunctivae normal.      Right eye: Right " conjunctiva is not injected.      Left eye: Left conjunctiva is not injected.   Neck:      Thyroid: No thyromegaly.      Vascular: No carotid bruit.   Cardiovascular:      Rate and Rhythm: Normal rate and regular rhythm.      Pulses: Normal pulses.      Heart sounds: Normal heart sounds, S1 normal and S2 normal. No murmur heard.     No friction rub. No gallop.   Pulmonary:      Effort: Pulmonary effort is normal. No respiratory distress.      Breath sounds: Normal breath sounds. No stridor. No decreased breath sounds, wheezing, rhonchi or rales.   Chest:      Chest wall: No tenderness.   Abdominal:      General: Bowel sounds are normal.      Palpations: Abdomen is soft.      Tenderness: There is no abdominal tenderness.   Musculoskeletal:      Right hand: Deformity present.      Cervical back: Neck supple.      Right lower leg: No edema.      Left lower leg: No edema.      Comments: Right hand contracture and deformity due to CP.   Lymphadenopathy:      Cervical: No cervical adenopathy.   Skin:     General: Skin is warm and dry.      Findings: No erythema or rash.      Comments: Scalp alopecia   Neurological:      Mental Status: He is alert. Mental status is at baseline.      Motor: Atrophy present.      Comments: Right sided weakness .  Atrophy of major muscle groups of right upper and lower group with minimal diminution and strength.  Had difficulty standing independently on right leg   Psychiatric:         Attention and Perception: Attention normal.         Speech: Speech normal.         Behavior: Behavior is cooperative.           Assessment:       No visits with results within 3 Month(s) from this visit.   Latest known visit with results is:   Office Visit on 03/04/2021   Component Date Value Ref Range Status    Cholesterol 03/26/2021 134  <200 mg/dL Final    HDL 03/26/2021 60  > OR = 40 mg/dL Final    Triglycerides 03/26/2021 50  <150 mg/dL Final    LDL Cholesterol 03/26/2021 62  mg/dL (calc) Final     HDL/Cholesterol Ratio 03/26/2021 2.2  <5.0 (calc) Final    Non HDL Chol. (LDL+VLDL) 03/26/2021 74  <130 mg/dL (calc) Final    Creatinine, Urine 03/26/2021 52  20 - 320 mg/dL Final    Microalb, Ur 03/26/2021 <0.2  See Note: mg/dL Final    Microalb/Creat Ratio 03/26/2021 NOTE  <30 mcg/mg creat Final       1. Essential hypertension  Comments:  Currently on lisinopril.  Discussed about diet, weight management, salt intake, stress and sleep.  Probably sleep apnea  Overview:  Dx updated per 2019 IMO Load    Orders:  -     lisinopriL 10 MG tablet; Take 1 tablet (10 mg total) by mouth once daily.  Dispense: 90 tablet; Refill: 1  -     Lipid Panel; Future; Expected date: 02/27/2024  -     Comprehensive Metabolic Panel; Future; Expected date: 02/27/2024  -     Microalbumin/creatinine urine ratio; Future; Expected date: 02/27/2024  -     Ambulatory referral/consult to Pulmonology; Future; Expected date: 03/04/2024    2. Mild cerebral palsy  Comments:  Weakness and atrophy of musculature on the right side.  [place] neck deformities in the index and middle finger.  Orders:  -     Ambulatory referral/consult to Pulmonology; Future; Expected date: 03/04/2024    3. Sleep apnea, unspecified type  Comments:  Years ago he had a sleep study which indicated that he would sleep apnea.  Did not accept CPAP device.  Orders:  -     Ambulatory referral/consult to Pulmonology; Future; Expected date: 03/04/2024    4. Scarring alopecia  Comments:  Following with Dermatology and on Loniten, Tazorac and cyclosporin.    5. H/O cerebral palsy    Patient's blood pressures are overall doing okay.    His history also has been noted and contributing factors to blood pressure might include potential sleep apnea.      Discussed about diet.  He is nonsmoker.  Couple or so of drinks towards weekends and can not really blame that.    Since he had a sleep study done in past it is unlikely that insurance will authorize another sleep study with a short period of  time.  Will refer to Dr. Lazarus Prince for evaluation concerning his sleep issues.         Plan:   Essential hypertension  Comments:  Currently on lisinopril.  Discussed about diet, weight management, salt intake, stress and sleep.  Probably sleep apnea  Orders:  -     lisinopriL 10 MG tablet; Take 1 tablet (10 mg total) by mouth once daily.  Dispense: 90 tablet; Refill: 1  -     Lipid Panel; Future; Expected date: 02/27/2024  -     Comprehensive Metabolic Panel; Future; Expected date: 02/27/2024  -     Microalbumin/creatinine urine ratio; Future; Expected date: 02/27/2024  -     Ambulatory referral/consult to Pulmonology; Future; Expected date: 03/04/2024    Mild cerebral palsy  Comments:  Weakness and atrophy of musculature on the right side.  [place] neck deformities in the index and middle finger.  Orders:  -     Ambulatory referral/consult to Pulmonology; Future; Expected date: 03/04/2024    Sleep apnea, unspecified type  Comments:  Years ago he had a sleep study which indicated that he would sleep apnea.  Did not accept CPAP device.  Orders:  -     Ambulatory referral/consult to Pulmonology; Future; Expected date: 03/04/2024    Scarring alopecia  Comments:  Following with Dermatology and on Loniten, Tazorac and cyclosporin.    H/O cerebral palsy    Umair's blood pressures are doing fairly okay.  Sometimes he may go up tight and the blood pressures might climbed up.  New prescription has been given for lisinopril.    His history of cerebral palsy has been noted and he is doing fairly well with that.  He does follow-up with Neurology Dr. Mat alvarez in [place] and gets a prescription for Xanax to prevent muscular spasms.  Other muscle relaxers probably make him more groggy.  He does work on a roof as a  and he is careful.  Evidently his medication for anxiety which is a tranquilizer does not sedate him when he climbs on the roof.  Wife assures me that he has not sedated and he is more calm.      Diet,  incorporation of greens, vegetables, fruits and cutting down on fried and fatty food discussed.    Role of regular exercise and weight management discussed.      Recommend a follow-up to discuss further preventive care measures like tetanus vaccination, influenza vaccination and likewise.    Follow up in about 6 months (around 8/26/2024), or if symptoms worsen or fail to improve, for Hypertension/lipids, Preventive Physical.    Spent samra 30 minutes with patient which involved review of pts medical conditions, labs, medications and with 50% of time face-to-face discussion about medical problems, management and any applicable changes.        Current Outpatient Medications:     ALPRAZolam (XANAX) 0.5 MG tablet, Xanax 0.5 mg tablet  Take 1 tablet 3 times a day by oral route., Disp: , Rfl:     cycloSPORINE modified, NEORAL, (NEORAL) 100 MG capsule, Take 100 mg by mouth 2 (two) times daily., Disp: , Rfl:     glycopyrrolate (ROBINUL) 2 MG Tab, Take 2 mg by mouth once daily., Disp: , Rfl:     ketoconazole (NIZORAL) 2 % shampoo, SMARTSIG:Topical 2-3 Times Weekly, Disp: , Rfl:     minoxidiL (LONITEN) 2.5 MG tablet, Take 5 mg by mouth once daily. 2  2.5 MG DAILY, Disp: , Rfl:     lisinopriL 10 MG tablet, Take 1 tablet (10 mg total) by mouth once daily., Disp: 90 tablet, Rfl: 1    tazarotene (TAZORAC) 0.1 % cream, Apply pea sized amount nightly as tolerated. (Patient not taking: Reported on 2/26/2024), Disp: 60 g, Rfl: 5    Cy Hugo

## 2024-02-26 ENCOUNTER — OFFICE VISIT (OUTPATIENT)
Dept: FAMILY MEDICINE | Facility: CLINIC | Age: 49
End: 2024-02-26
Payer: COMMERCIAL

## 2024-02-26 VITALS
BODY MASS INDEX: 30.45 KG/M2 | RESPIRATION RATE: 18 BRPM | SYSTOLIC BLOOD PRESSURE: 126 MMHG | HEIGHT: 67 IN | DIASTOLIC BLOOD PRESSURE: 79 MMHG | HEART RATE: 87 BPM | OXYGEN SATURATION: 98 % | WEIGHT: 194 LBS

## 2024-02-26 DIAGNOSIS — G47.30 SLEEP APNEA, UNSPECIFIED TYPE: Chronic | ICD-10-CM

## 2024-02-26 DIAGNOSIS — I10 ESSENTIAL HYPERTENSION: Primary | Chronic | ICD-10-CM

## 2024-02-26 DIAGNOSIS — L66.9 SCARRING ALOPECIA: Chronic | ICD-10-CM

## 2024-02-26 DIAGNOSIS — G80.9 MILD CEREBRAL PALSY: ICD-10-CM

## 2024-02-26 DIAGNOSIS — Z86.69 H/O CEREBRAL PALSY: ICD-10-CM

## 2024-02-26 PROCEDURE — 3078F DIAST BP <80 MM HG: CPT | Mod: CPTII,S$GLB,, | Performed by: INTERNAL MEDICINE

## 2024-02-26 PROCEDURE — 4010F ACE/ARB THERAPY RXD/TAKEN: CPT | Mod: CPTII,S$GLB,, | Performed by: INTERNAL MEDICINE

## 2024-02-26 PROCEDURE — 3008F BODY MASS INDEX DOCD: CPT | Mod: CPTII,S$GLB,, | Performed by: INTERNAL MEDICINE

## 2024-02-26 PROCEDURE — 1159F MED LIST DOCD IN RCRD: CPT | Mod: CPTII,S$GLB,, | Performed by: INTERNAL MEDICINE

## 2024-02-26 PROCEDURE — 99999 PR PBB SHADOW E&M-EST. PATIENT-LVL V: CPT | Mod: PBBFAC,,, | Performed by: INTERNAL MEDICINE

## 2024-02-26 PROCEDURE — 99214 OFFICE O/P EST MOD 30 MIN: CPT | Mod: S$GLB,,, | Performed by: INTERNAL MEDICINE

## 2024-02-26 PROCEDURE — 1160F RVW MEDS BY RX/DR IN RCRD: CPT | Mod: CPTII,S$GLB,, | Performed by: INTERNAL MEDICINE

## 2024-02-26 PROCEDURE — 3074F SYST BP LT 130 MM HG: CPT | Mod: CPTII,S$GLB,, | Performed by: INTERNAL MEDICINE

## 2024-02-26 RX ORDER — LISINOPRIL 10 MG/1
10 TABLET ORAL DAILY
Qty: 90 TABLET | Refills: 1 | Status: SHIPPED | OUTPATIENT
Start: 2024-02-26 | End: 2024-08-26

## 2024-02-26 NOTE — Clinical Note
Saw this gentleman today.  He potentially has sleep apnea and had a sleep study which was positive was but was scared of taking or using a CPAP device.  Will refer to Dr. Lazarus Prince for further counseling on this issue.  He may not be allowed another sleep study if 1 was done within 5 years.  DIANA.  I have advised him to follow up with you in 6 months for annual physical.

## 2024-06-23 NOTE — PROGRESS NOTES
Subjective:       Patient ID: Umair Jackson is a 48 y.o. male.    Chief Complaint: Follow-up, Hypertension, H/O Siezure disorder, and Alopecia    Patient is a 48-year-old male who comes for his six-month follow-up.  Thus far he was following up with PADMINI Craven.    He saw me about 6 months ago for an interim visit to refill his medications.      Issues are as below:-    1.-hypertension since several years currently on lisinopril 10 mg  2.-history of seizure disorder  3.-mono plegic cerebral palsy-following up with Dr. Silvestre  4.-lichen planopilaris  5.-anxiety and body stiffening provoked by certain situations for which he may take an occasional Xanax.-Dr. Silvestre.  6.-another issue which has not been documented thus far is that he was indeed diagnosed with sleep apnea based on a home study but when it came time to get a CPAP device he backed off.  His wife is concerned about his snoring though there has been no witnessed apneic episode or significant gasping.  She would like this to be reopened and get an expert opinion before there is a compulsion for him to use a mask.  7.-scarring alopecia currently on minoxidil, cyclosporin and Tazorac.  Follows up with dermatologist    Recent labs were ordered in the month of February 2024 and at the time of preparation of MyChart were still pending.      Preventive care measures like pneumonia vaccine, tetanus vaccination, COVID precautions have been discussed    Hypertension  This is a chronic problem. The current episode started more than 1 year ago. The problem is unchanged. Pertinent negatives include no chest pain, headaches, neck pain, palpitations or shortness of breath. Past treatments include ACE inhibitors. The current treatment provides moderate improvement. There is no history of angina, kidney disease, CAD/MI, CVA, heart failure or left ventricular hypertrophy. Sleep apnea: Probably has a diagnosis of sleep apnea.       Past Medical History:    Diagnosis Date    Cerebral palsy, hemiplegic     Seizures 01/01/2002     Social History     Socioeconomic History    Marital status:    Tobacco Use    Smoking status: Never    Smokeless tobacco: Never   Substance and Sexual Activity    Alcohol use: Yes     Comment: very little    Drug use: Never    Sexual activity: Yes     Social Determinants of Health     Financial Resource Strain: Low Risk  (6/26/2024)    Overall Financial Resource Strain (CARDIA)     Difficulty of Paying Living Expenses: Not very hard   Food Insecurity: No Food Insecurity (6/26/2024)    Hunger Vital Sign     Worried About Running Out of Food in the Last Year: Never true     Ran Out of Food in the Last Year: Never true   Transportation Needs: No Transportation Needs (6/26/2024)    TRANSPORTATION NEEDS     Transportation : No   Physical Activity: Insufficiently Active (6/26/2024)    Exercise Vital Sign     Days of Exercise per Week: 3 days     Minutes of Exercise per Session: 40 min   Stress: No Stress Concern Present (6/26/2024)    [employer] - Occupational Stress Questionnaire     Feeling of Stress : Only a little   Housing Stability: Low Risk  (6/26/2024)    Housing Stability Vital Sign     Unable to Pay for Housing in the Last Year: No     Homeless in the Last Year: No     History reviewed. No pertinent surgical history.  Family History   Problem Relation Name Age of Onset    Hypertension Father         Review of Systems   Constitutional:  Negative for activity change, appetite change, chills, diaphoresis, fatigue, fever and unexpected weight change.   HENT:  Negative for hearing loss and rhinorrhea.    Eyes:  Negative for pain, discharge and visual disturbance.   Respiratory:  Positive for apnea. Negative for cough, chest tightness, shortness of breath and wheezing.    Cardiovascular:  Negative for chest pain and palpitations.        Htn   Gastrointestinal:  Negative for abdominal pain, blood in stool,  "constipation, diarrhea, nausea and vomiting.   Endocrine: Negative for polydipsia and polyuria.   Genitourinary:  Negative for difficulty urinating, flank pain, frequency, hematuria and urgency.   Musculoskeletal:  Negative for arthralgias, back pain, joint swelling and neck pain.   Skin:  Negative for color change and rash.        Minoxidil Is for hair loss.  Not for blood pressure.  Scarring alopecia.    He also gets cyclosporin for controlling his immune system.   Neurological:  Positive for weakness. Negative for dizziness, seizures, syncope, numbness and headaches.        So history of cerebral palsy with residual weakness on the right side.   Hematological:  Negative for adenopathy.   Psychiatric/Behavioral:  Negative for confusion, dysphoric mood and sleep disturbance. The patient is not nervous/anxious.          Objective:      Blood pressure 128/81, pulse (P) 81, height 5' 7" (1.702 m), weight 87.1 kg (192 lb). Body mass index is 30.07 kg/m².  Physical Exam  Vitals and nursing note reviewed.   Constitutional:       General: He is awake. He is not in acute distress.     Appearance: Normal appearance. He is overweight. He is not ill-appearing, toxic-appearing or diaphoretic.      Comments: BMI 30.38   HENT:      Head: Normocephalic and atraumatic.      Right Ear: There is no impacted cerumen.      Left Ear: There is no impacted cerumen.   Eyes:      General: Lids are normal. Gaze aligned appropriately.      Conjunctiva/sclera: Conjunctivae normal.      Right eye: Right conjunctiva is not injected.      Left eye: Left conjunctiva is not injected.   Neck:      Thyroid: No thyromegaly.      Vascular: No carotid bruit.   Cardiovascular:      Rate and Rhythm: Normal rate and regular rhythm.      Pulses: Normal pulses.      Heart sounds: Normal heart sounds, S1 normal and S2 normal. No murmur heard.     No friction rub. No gallop.   Pulmonary:      Effort: Pulmonary effort is normal. No respiratory distress.      " Breath sounds: Normal breath sounds. No stridor. No decreased breath sounds, wheezing, rhonchi or rales.   Chest:      Chest wall: No tenderness.   Abdominal:      General: Bowel sounds are normal.      Palpations: Abdomen is soft.      Tenderness: There is no abdominal tenderness.   Musculoskeletal:      Right hand: Deformity present.      Cervical back: Neck supple.      Right lower leg: No edema.      Left lower leg: No edema.      Comments: Right hand contracture and deformity due to CP.   Lymphadenopathy:      Cervical: No cervical adenopathy.   Skin:     General: Skin is warm and dry.      Findings: No erythema or rash.      Comments: Scalp alopecia   Neurological:      Mental Status: He is alert. Mental status is at baseline.      Motor: Atrophy present.      Comments: Right sided weakness .  Atrophy of major muscle groups of right upper and lower group with minimal diminution and strength.  Had difficulty standing independently on right leg   Psychiatric:         Attention and Perception: Attention normal.         Speech: Speech normal.         Behavior: Behavior is cooperative.           Assessment:       No visits with results within 3 Month(s) from this visit.   Latest known visit with results is:   Office Visit on 02/26/2024   Component Date Value Ref Range Status    Cholesterol 06/25/2024 139  <200 mg/dL Final    HDL 06/25/2024 58  > OR = 40 mg/dL Final    Triglycerides 06/25/2024 106  <150 mg/dL Final    LDL Cholesterol 06/25/2024 62  mg/dL (calc) Final    HDL/Cholesterol Ratio 06/25/2024 2.4  <5.0 (calc) Final    Non HDL Chol. (LDL+VLDL) 06/25/2024 81  <130 mg/dL (calc) Final       1. Essential hypertension  Overview:  Dx updated per 2019 IMO Load    Orders:  -     Basic Metabolic Panel; Future; Expected date: 12/26/2024  -     Microalbumin/Creatinine Ratio, Urine; Future; Expected date: 12/26/2024    2. Mild cerebral palsy    3. Sleep apnea, unspecified type    4. H/O cerebral palsy    5. Scarring  alopecia    6. Spastic cerebral palsy    7. Generalized convulsive epilepsy             Plan:   Essential hypertension  -     Basic Metabolic Panel; Future; Expected date: 12/26/2024  -     Microalbumin/Creatinine Ratio, Urine; Future; Expected date: 12/26/2024    Mild cerebral palsy    Sleep apnea, unspecified type    H/O cerebral palsy    Scarring alopecia    Spastic cerebral palsy    Generalized convulsive epilepsy    As far as patient's blood pressures are concerned, they seem to be well controlled.    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  Patient's wife seems to be extremely comfortable with him taking Xanax which apparently relaxes and more    Long-term use of Loniten has been noted for hair loss.  He was doing well with the Loniten for hair loss due to alopecia.  There is some hair growth at this point.    Episodes of sweating has been noted for which he takes Robinul.  He reports no constipation with this medication.    Social determinants of health care also have been reviewed.  He does exercise also regularly.    As far as natural methods to control blood pressure, the 5 important S of blood pressure control include the following:-    1.-salt   2.-stress   3-smoke-he does not smoke  4.-scotch or alcohol he does not consume too much alcohol.  He is very modest on that   5.-sleep-his sleep is fairly good.    Perhaps he can try avoiding the blood pressure medications to begin on Saturday and Sunday when he is off and see how much is a difference between a workday and being off.  If his blood pressures continue to be good we can start tapering off program but will follow that slowly and steadily.  He has been taking the blood pressure medication since about 15 years now.    His father did have heart condition but he is doing okay at this point.        Follow up in about 6 months (around 12/26/2024), or  if symptoms worsen or fail to improve, for Hypertension/lipids.      Current Outpatient Medications:     ALPRAZolam (XANAX) 0.5 MG tablet, Xanax 0.5 mg tablet  Take 1 tablet 3 times a day by oral route., Disp: , Rfl:     cycloSPORINE modified, NEORAL, (NEORAL) 100 MG capsule, Take 100 mg by mouth 2 (two) times daily., Disp: , Rfl:     glycopyrrolate (ROBINUL) 2 MG Tab, Take 2 mg by mouth once daily., Disp: , Rfl:     ketoconazole (NIZORAL) 2 % shampoo, SMARTSIG:Topical 2-3 Times Weekly, Disp: , Rfl:     lisinopriL 10 MG tablet, Take 1 tablet (10 mg total) by mouth once daily., Disp: 90 tablet, Rfl: 1    minoxidiL (LONITEN) 2.5 MG tablet, Take 5 mg by mouth once daily. 2  2.5 MG DAILY, Disp: , Rfl:     tazarotene (TAZORAC) 0.1 % cream, Apply pea sized amount nightly as tolerated., Disp: 60 g, Rfl: 5    Cy Oanh

## 2024-06-26 ENCOUNTER — OFFICE VISIT (OUTPATIENT)
Dept: FAMILY MEDICINE | Facility: CLINIC | Age: 49
End: 2024-06-26
Payer: COMMERCIAL

## 2024-06-26 VITALS
BODY MASS INDEX: 30.13 KG/M2 | SYSTOLIC BLOOD PRESSURE: 128 MMHG | HEIGHT: 67 IN | DIASTOLIC BLOOD PRESSURE: 81 MMHG | WEIGHT: 192 LBS

## 2024-06-26 DIAGNOSIS — G47.30 SLEEP APNEA, UNSPECIFIED TYPE: ICD-10-CM

## 2024-06-26 DIAGNOSIS — G80.1 SPASTIC CEREBRAL PALSY: ICD-10-CM

## 2024-06-26 DIAGNOSIS — G40.309 GENERALIZED CONVULSIVE EPILEPSY: ICD-10-CM

## 2024-06-26 DIAGNOSIS — Z86.69 H/O CEREBRAL PALSY: ICD-10-CM

## 2024-06-26 DIAGNOSIS — I10 ESSENTIAL HYPERTENSION: Primary | ICD-10-CM

## 2024-06-26 DIAGNOSIS — L66.9 SCARRING ALOPECIA: ICD-10-CM

## 2024-06-26 DIAGNOSIS — G80.9 MILD CEREBRAL PALSY: ICD-10-CM

## 2024-06-26 PROCEDURE — 3079F DIAST BP 80-89 MM HG: CPT | Mod: CPTII,S$GLB,, | Performed by: INTERNAL MEDICINE

## 2024-06-26 PROCEDURE — 1160F RVW MEDS BY RX/DR IN RCRD: CPT | Mod: CPTII,S$GLB,, | Performed by: INTERNAL MEDICINE

## 2024-06-26 PROCEDURE — 3008F BODY MASS INDEX DOCD: CPT | Mod: CPTII,S$GLB,, | Performed by: INTERNAL MEDICINE

## 2024-06-26 PROCEDURE — 3074F SYST BP LT 130 MM HG: CPT | Mod: CPTII,S$GLB,, | Performed by: INTERNAL MEDICINE

## 2024-06-26 PROCEDURE — 99999 PR PBB SHADOW E&M-EST. PATIENT-LVL III: CPT | Mod: PBBFAC,,, | Performed by: INTERNAL MEDICINE

## 2024-06-26 PROCEDURE — 1159F MED LIST DOCD IN RCRD: CPT | Mod: CPTII,S$GLB,, | Performed by: INTERNAL MEDICINE

## 2024-06-26 PROCEDURE — 99213 OFFICE O/P EST LOW 20 MIN: CPT | Mod: S$GLB,,, | Performed by: INTERNAL MEDICINE

## 2024-06-26 PROCEDURE — 4010F ACE/ARB THERAPY RXD/TAKEN: CPT | Mod: CPTII,S$GLB,, | Performed by: INTERNAL MEDICINE

## 2024-10-04 DIAGNOSIS — I10 ESSENTIAL HYPERTENSION: Chronic | ICD-10-CM

## 2024-10-04 RX ORDER — LISINOPRIL 10 MG/1
10 TABLET ORAL
Qty: 90 TABLET | Refills: 1 | Status: SHIPPED | OUTPATIENT
Start: 2024-10-04

## 2024-10-23 ENCOUNTER — TELEPHONE (OUTPATIENT)
Dept: FAMILY MEDICINE | Facility: CLINIC | Age: 49
End: 2024-10-23
Payer: COMMERCIAL

## 2024-10-23 NOTE — TELEPHONE ENCOUNTER
----- Message from Pham sent at 10/23/2024  2:47 PM CDT -----  Jocelyn the patients wife  is returning  a call.  Jocelyn said the patient already had a referral  from his last visit, but she lost the paper when they moved.  # [phone] GH

## 2024-10-23 NOTE — TELEPHONE ENCOUNTER
----- Message from Med Assistant Lam sent at 10/23/2024 12:07 PM CDT -----  Daughter is calling to get info on getting a sleep study     # [phone]

## 2024-11-25 ENCOUNTER — TELEPHONE (OUTPATIENT)
Dept: PULMONOLOGY | Facility: CLINIC | Age: 49
End: 2024-11-25
Payer: COMMERCIAL

## 2024-11-25 NOTE — TELEPHONE ENCOUNTER
----- Message from Ashley sent at 11/25/2024 10:25 AM CST -----  Contact: Jocelyn  [phone]  Type:  Patient Returning Call    Who Called:Jocelyn  Who Left Message for Patient Anna   Does the patient know what this is regarding?:rescheduling   Would the patient rather a call back or a response via [facility]?   Best Call Back Number: [phone]  Additional Information:

## 2024-12-11 ENCOUNTER — TELEPHONE (OUTPATIENT)
Dept: FAMILY MEDICINE | Facility: CLINIC | Age: 49
End: 2024-12-11
Payer: COMMERCIAL

## 2024-12-11 NOTE — TELEPHONE ENCOUNTER
----- Message from Kristal sent at 12/11/2024  2:52 PM CST -----  Contact: Jonah  Wife Jonah calling to see where patient labs orders are and if the patient needs to fast for his labs?  [phone]